# Patient Record
Sex: FEMALE | Race: WHITE | NOT HISPANIC OR LATINO | Employment: FULL TIME | URBAN - METROPOLITAN AREA
[De-identification: names, ages, dates, MRNs, and addresses within clinical notes are randomized per-mention and may not be internally consistent; named-entity substitution may affect disease eponyms.]

---

## 2021-07-21 PROBLEM — K81.0 ACUTE CHOLECYSTITIS: Status: ACTIVE | Noted: 2021-07-21

## 2021-07-22 PROBLEM — R07.9 CHEST PAIN: Status: ACTIVE | Noted: 2021-07-22

## 2022-08-04 PROBLEM — R05.3 CHRONIC COUGHING: Status: ACTIVE | Noted: 2022-08-04

## 2023-11-06 PROBLEM — R73.9 HYPERGLYCEMIA: Status: ACTIVE | Noted: 2023-11-06

## 2023-11-06 PROBLEM — Z00.00 PREVENTATIVE HEALTH CARE: Status: ACTIVE | Noted: 2023-11-06

## 2023-11-06 PROBLEM — Z00.00 PREVENTATIVE HEALTH CARE: Status: RESOLVED | Noted: 2023-11-06 | Resolved: 2023-11-06

## 2023-11-06 PROBLEM — D64.9 NORMOCYTIC ANEMIA: Status: ACTIVE | Noted: 2023-11-06

## 2024-01-17 PROBLEM — J02.9 SORE THROAT: Status: ACTIVE | Noted: 2024-01-17

## 2024-01-17 PROBLEM — R05.9 COUGH: Status: ACTIVE | Noted: 2024-01-17

## 2024-04-23 ENCOUNTER — TELEPHONE (OUTPATIENT)
Dept: OTOLARYNGOLOGY | Facility: CLINIC | Age: 64
End: 2024-04-23
Payer: MEDICARE

## 2024-04-23 NOTE — TELEPHONE ENCOUNTER
----- Message from Yohana Silva RN sent at 4/23/2024 12:39 PM CDT -----  Regarding: [facility] ER visit f/u 4/20/2024 Dx: Submandibular gland swelling, cervical lymphadenopathy  Please call patient to schedule an appointment for further evaluation/treatment of submandibular gland swelling. ENT referral placed.     Thanks,    Yohana Silva RN, BSN  ED Navigator/Case Management  [phone]

## 2024-04-24 PROBLEM — R05.9 COUGH: Status: RESOLVED | Noted: 2024-01-17 | Resolved: 2024-04-24

## 2024-04-24 PROBLEM — K81.0 ACUTE CHOLECYSTITIS: Status: RESOLVED | Noted: 2021-07-21 | Resolved: 2024-04-24

## 2024-04-24 PROBLEM — J02.9 SORE THROAT: Status: RESOLVED | Noted: 2024-01-17 | Resolved: 2024-04-24

## 2024-04-24 PROBLEM — R07.9 CHEST PAIN: Status: RESOLVED | Noted: 2021-07-22 | Resolved: 2024-04-24

## 2024-04-24 PROBLEM — E66.09 CLASS 1 OBESITY DUE TO EXCESS CALORIES WITHOUT SERIOUS COMORBIDITY IN ADULT: Status: ACTIVE | Noted: 2024-04-24

## 2024-04-24 PROBLEM — Z00.01 ENCOUNTER FOR GENERAL ADULT MEDICAL EXAMINATION WITH ABNORMAL FINDINGS: Status: ACTIVE | Noted: 2024-04-24

## 2024-04-24 PROBLEM — Z00.01 ENCOUNTER FOR GENERAL ADULT MEDICAL EXAMINATION WITH ABNORMAL FINDINGS: Status: RESOLVED | Noted: 2024-04-24 | Resolved: 2024-04-24

## 2024-05-01 ENCOUNTER — OFFICE VISIT (OUTPATIENT)
Dept: OTOLARYNGOLOGY | Facility: CLINIC | Age: 64
End: 2024-05-01
Payer: MEDICARE

## 2024-05-01 VITALS — WEIGHT: 188.94 LBS | HEIGHT: 62 IN | BODY MASS INDEX: 34.77 KG/M2

## 2024-05-01 DIAGNOSIS — K11.20 SIALADENITIS: ICD-10-CM

## 2024-05-01 PROCEDURE — 3008F BODY MASS INDEX DOCD: CPT | Mod: CPTII,S$GLB,, | Performed by: NURSE PRACTITIONER

## 2024-05-01 PROCEDURE — 1125F AMNT PAIN NOTED PAIN PRSNT: CPT | Mod: CPTII,S$GLB,, | Performed by: NURSE PRACTITIONER

## 2024-05-01 PROCEDURE — 99999 PR PBB SHADOW E&M-EST. PATIENT-LVL IV: CPT | Mod: PBBFAC,,, | Performed by: NURSE PRACTITIONER

## 2024-05-01 PROCEDURE — 1159F MED LIST DOCD IN RCRD: CPT | Mod: CPTII,S$GLB,, | Performed by: NURSE PRACTITIONER

## 2024-05-01 PROCEDURE — 1101F PT FALLS ASSESS-DOCD LE1/YR: CPT | Mod: CPTII,S$GLB,, | Performed by: NURSE PRACTITIONER

## 2024-05-01 PROCEDURE — 3288F FALL RISK ASSESSMENT DOCD: CPT | Mod: CPTII,S$GLB,, | Performed by: NURSE PRACTITIONER

## 2024-05-01 PROCEDURE — 99203 OFFICE O/P NEW LOW 30 MIN: CPT | Mod: S$GLB,,, | Performed by: NURSE PRACTITIONER

## 2024-05-01 NOTE — PROGRESS NOTES
"Subjective     Patient ID: Carolina Cervantes is a 63 y.o. female.    Chief Complaint: Neck Swelling (Blocked salivary gland causing slight pain and swelling to the neck for 2 weeks.)    HPI  Patient is new to ENT, went to ED on 04/20/2024 for salivary gland swelling. Ultrasound consistent with right submandibular sialadenitis. Treated with Clindamycin, which she completed.  Given an 2nd round of Clindamycin, plus a Medrol Dose Pack and narcotic analgesics by PCP on 04/24/2024, which she is still taking. Patient states swelling has gone down significantly; less than half the size it was nearly 2 weeks ago. Pain is gone. Patient states the right submandibular duct "came to a head" and drained recently which helped relieve a lot of the pain. Patient admits she does not drink any water and definitely not enough fluids.     Review of Systems   Constitutional: Negative.    HENT: Negative.     Eyes: Negative.    Respiratory: Negative.     Cardiovascular: Negative.    Gastrointestinal: Negative.    Musculoskeletal: Negative.    Integumentary:  Negative.   Neurological: Negative.    Hematological: Negative.    Psychiatric/Behavioral: Negative.            Objective     Physical Exam  Vitals and nursing note reviewed.   Constitutional:       General: She is not in acute distress.     Appearance: She is well-developed. She is not ill-appearing.   HENT:      Head: Normocephalic and atraumatic.      Right Ear: Hearing, tympanic membrane, ear canal and external ear normal. No middle ear effusion. Tympanic membrane is not erythematous.      Left Ear: Hearing, tympanic membrane, ear canal and external ear normal.  No middle ear effusion. Tympanic membrane is not erythematous.      Nose: Nose normal.      Mouth/Throat:      Pharynx: Oropharynx is clear. Uvula midline.      Tonsils: 0 on the right. 0 on the left.      Comments: Abundant clear saliva expressed from bilateral Stensen's ducts with palpation of parotids.  Abundant clear saliva " expressed from bilateral Grannis's ducts with palpation of submandibular salivary glands. Right Grannis's duct edema noted, and erythema is noted at the right punctum, but no obvious stone or purulence.   Eyes:      General: Lids are normal. No scleral icterus.        Right eye: No discharge.         Left eye: No discharge.   Neck:      Trachea: Trachea normal. No tracheal deviation.   Cardiovascular:      Rate and Rhythm: Normal rate.   Pulmonary:      Effort: Pulmonary effort is normal. No respiratory distress.      Breath sounds: No stridor. No wheezing.   Musculoskeletal:         General: Normal range of motion.      Cervical back: Normal range of motion and neck supple.   Skin:     General: Skin is warm and dry.   Neurological:      Mental Status: She is alert and oriented to person, place, and time.      Coordination: Coordination normal.      Gait: Gait normal.   Psychiatric:         Attention and Perception: Attention normal.         Mood and Affect: Mood normal.         Speech: Speech normal.         Behavior: Behavior normal. Behavior is cooperative.          Assessment and Plan     1. Right Submandibular Gland Sialadenitis  -     Ambulatory referral/consult to ENT      Sialagogue protocol given and discussed. Patient to increase her water intake, with fresh lemon squeezed in, and to apply warm compress followed by massage several times per day. If not resolving, we discussed obtaining CT scan next. Patient agrees to call me. Patient encouraged to return to clinic if symptoms worsen/persist and as needed for further ENT symptoms or concerns.            No follow-ups on file.

## 2024-05-01 NOTE — PATIENT INSTRUCTIONS
Salivary gland instructions:    Increase water intake. Drink at least 6 eight ounces (8 oz) glasses of water with fresh lemon squeezed into the water each day.  Decrease caffeine intake.  This included soft drinks, coffee, tea, and chocolate.  Suck on sugar-free lemon candy or fresh lemon wedges, eat pickles.  Avoid salty foods.  When working outdoors or exercising, keep well hydrated.  Use a warm compress such as a moist hot towel over the affected area several times a day followed by massaging of the gland.  Imaging (CT scan) becomes necessary if you experience several exacerbations within one year, to rule out a stone/mass causing blockage in the gland.

## 2024-05-22 ENCOUNTER — PATIENT MESSAGE (OUTPATIENT)
Dept: OTOLARYNGOLOGY | Facility: CLINIC | Age: 64
End: 2024-05-22
Payer: MEDICARE

## 2024-07-19 ENCOUNTER — PATIENT MESSAGE (OUTPATIENT)
Dept: OTOLARYNGOLOGY | Facility: CLINIC | Age: 64
End: 2024-07-19
Payer: MEDICARE